# Patient Record
Sex: FEMALE | Race: WHITE | NOT HISPANIC OR LATINO | Employment: FULL TIME | URBAN - METROPOLITAN AREA
[De-identification: names, ages, dates, MRNs, and addresses within clinical notes are randomized per-mention and may not be internally consistent; named-entity substitution may affect disease eponyms.]

---

## 2020-06-28 ENCOUNTER — OFFICE VISIT (OUTPATIENT)
Dept: URGENT CARE | Facility: CLINIC | Age: 55
End: 2020-06-28
Payer: COMMERCIAL

## 2020-06-28 VITALS
HEART RATE: 64 BPM | RESPIRATION RATE: 16 BRPM | OXYGEN SATURATION: 100 % | BODY MASS INDEX: 19.63 KG/M2 | TEMPERATURE: 98.1 F | WEIGHT: 115 LBS | SYSTOLIC BLOOD PRESSURE: 143 MMHG | HEIGHT: 64 IN | DIASTOLIC BLOOD PRESSURE: 70 MMHG

## 2020-06-28 DIAGNOSIS — Z23 NEED FOR VACCINATION: ICD-10-CM

## 2020-06-28 DIAGNOSIS — T63.441A LOCAL REACTION TO BEE STING, ACCIDENTAL OR UNINTENTIONAL, INITIAL ENCOUNTER: Primary | ICD-10-CM

## 2020-06-28 PROCEDURE — 90471 IMMUNIZATION ADMIN: CPT | Performed by: EMERGENCY MEDICINE

## 2020-06-28 PROCEDURE — 90715 TDAP VACCINE 7 YRS/> IM: CPT

## 2020-06-28 PROCEDURE — 99203 OFFICE O/P NEW LOW 30 MIN: CPT | Performed by: EMERGENCY MEDICINE

## 2020-06-28 RX ORDER — PREDNISONE 10 MG/1
TABLET ORAL
Qty: 24 TABLET | Refills: 0 | Status: SHIPPED | OUTPATIENT
Start: 2020-06-28

## 2020-06-28 RX ORDER — EPINEPHRINE 0.3 MG/.3ML
0.3 INJECTION SUBCUTANEOUS ONCE
Qty: 0.6 ML | Refills: 0 | Status: SHIPPED | OUTPATIENT
Start: 2020-06-28 | End: 2020-06-28

## 2022-09-19 ENCOUNTER — OFFICE VISIT (OUTPATIENT)
Dept: OBGYN CLINIC | Facility: CLINIC | Age: 57
End: 2022-09-19
Payer: COMMERCIAL

## 2022-09-19 VITALS
SYSTOLIC BLOOD PRESSURE: 118 MMHG | HEIGHT: 63 IN | BODY MASS INDEX: 21.16 KG/M2 | TEMPERATURE: 97.6 F | WEIGHT: 119.4 LBS | DIASTOLIC BLOOD PRESSURE: 70 MMHG | HEART RATE: 83 BPM

## 2022-09-19 DIAGNOSIS — M25.532 LEFT WRIST PAIN: Primary | ICD-10-CM

## 2022-09-19 DIAGNOSIS — M18.12 ARTHRITIS OF CARPOMETACARPAL (CMC) JOINT OF LEFT THUMB: ICD-10-CM

## 2022-09-19 PROCEDURE — 99204 OFFICE O/P NEW MOD 45 MIN: CPT | Performed by: ORTHOPAEDIC SURGERY

## 2022-09-19 RX ORDER — NAPROXEN 500 MG/1
500 TABLET ORAL 2 TIMES DAILY WITH MEALS
Qty: 60 TABLET | Refills: 1 | Status: SHIPPED | OUTPATIENT
Start: 2022-09-19

## 2022-09-19 NOTE — PROGRESS NOTES
ASSESSMENT/PLAN:    Problem List Items Addressed This Visit        Other    Left wrist pain - Primary      Other Visit Diagnoses     Arthritis of carpometacarpal (CMC) joint of left thumb        Relevant Medications    naproxen (NAPROSYN) 500 mg tablet    Other Relevant Orders    Cock Up Wrist Splint        X-rays reviewed and discussed with the patient, which does not show any evidence of acute fracture, though is positive for [facility] joint degenerative changes  The patient was offered a [facility] joint CSI, though she declined  The patient also declined offer to begin formal PT  She was fitted with a thumb spica brace today, which she was instructed to wear full-time, removed only for cleansing  She was provided with a script for Naproxen  The patient will return to the office in 2 weeks for recheck  She was instructed to call or return to the office sooner if any problems arise  Return in about 2 weeks (around 10/3/2022)  _____________________________________________________  CHIEF COMPLAINT:  Chief Complaint   Patient presents with    Left Wrist - Pain         SUBJECTIVE:  Michelle Hartman is a right hand dominant 56 y o  female who presents to the office today for initial evaluation of a left wrist injury  The patient states that on 9/4/2022 she was out in the woods, clearing the trails for hunting season when she tripped over a fallen log, landing onto her knees and her outstretched left hand  The patient states that she felt immediate pain along her left wrist, as well as her right knee, but was able to get herself up and ambulate  The patient states that the pain in her left wrist continued, and she presented to an urgent care on 9/15/2022 for evaluation  X-rays of the wrist were taken and the patient was placed into a short arm splint due to concerns for a possible hairline fracture, per the patient   Today the patient states that she continues to experience pain along the base of the thumb and the radial aspect of the dorsal wrist  The patient denies any history of prior fracture or injury to the hand or wrist, but does admit to experiencing episodes of clicking and pain along the thumb/radial aspect of the wrist prior to this injury  She also admits to occasionally dropping things with the left hand  She denies any numbness or tingling  The patient has been taking Tylenol for pain relief  PAST MEDICAL HISTORY:  Past Medical History:   Diagnosis Date    Allergic        PAST SURGICAL HISTORY:  Past Surgical History:   Procedure Laterality Date    BUNIONECTOMY      CHOLECYSTECTOMY      PARTIAL HYSTERECTOMY      SHOULDER SURGERY      TUBAL LIGATION         FAMILY HISTORY:  History reviewed  No pertinent family history  SOCIAL HISTORY:  Social History     Tobacco Use    Smoking status: Never Smoker    Smokeless tobacco: Never Used   Vaping Use    Vaping Use: Never used   Substance Use Topics    Alcohol use: Yes    Drug use: Never       MEDICATIONS:    Current Outpatient Medications:     EPINEPHrine (EPIPEN) 0 3 mg/0 3 mL SOAJ, Inject 0 3 mL (0 3 mg total) into a muscle once for 1 dose, Disp: 0 6 mL, Rfl: 0    naproxen (NAPROSYN) 500 mg tablet, Take 1 tablet (500 mg total) by mouth 2 (two) times a day with meals, Disp: 60 tablet, Rfl: 1    predniSONE 10 mg tablet, Take once daily all days pills on this schedule 5- 5- 4- 4- 3- 2- 1 (Patient not taking: Reported on 9/19/2022), Disp: 24 tablet, Rfl: 0    ALLERGIES:  Allergies   Allergen Reactions    Bee Venom Edema    Penicillins Rash       Review of systems:   Constitutional: Negative for fatigue, fever or loss of apetite  HENT: Negative  Respiratory: Negative for shortness of breath, dyspnea  Cardiovascular: Negative for chest pain/tightness  Gastrointestinal: Negative for abdominal pain, N/V  Endocrine: Negative for cold/heat intolerance, unexplained weight loss/gain  Genitourinary: Negative for flank pain, dysuria, hematuria  Musculoskeletal: acute left wrist pain as noted in HPI   Skin: Negative for rash  Neurological: negative for numbness or tingling  Psychiatric/Behavioral: Negative for agitation  _____________________________________________________  PHYSICAL EXAMINATION:    Blood pressure 118/70, pulse 83, temperature 97 6 °F (36 4 °C), temperature source Temporal, height 5' 3" (1 6 m), weight 54 2 kg (119 lb 6 4 oz)  General: well developed and well nourished, alert, oriented times 3 and appears comfortable  Psychiatric: Normal  HEENT: Benign  Cardiovascular: Regular    Pulmonary: No wheezing or stridor  Abdomen: Soft, Nontender  Skin: No masses, erythema, lacerations, fluctation, ulcerations  Neurovascular: strong distal pulses, sensory and motor testing intact    MUSCULOSKELETAL EXAMINATION:    Left hand/wrist:  - short arm splint removed today in office without complication  Skin without lesions, ecchymosis, erythema, swelling, warmth, or other signs of infection  - there is no visible or palpable deformity   - the patient complains of palpable tenderness along the proximal first metacarpal, CMC joint  There is also snuffbox tenderness, though the patient notes that the pain is more severe along the [facility] joint  No tenderness along the distal radius or ulna  - Full active flexion/extension of the thumb MCP, IP joints  The patient does note pain along the thumb and wrist with thumb hyperextension  No pain with thumb abduction or adduction    - Full active ROM of all other digits without complaint   -  Full active ROM of the wrist without complaint, including radial and ulnar deviation  - patient is able to make a composite fist, 5/5  strength  - 5/5 strength resisted thumb flexion and extension  - there is noted snapping of the EPL tendon with ROM testing, though this is also present on the contralateral side     - negative CMC grind  - negative Finkelstein's  - mild pain during resisted thumb abduction at the [facility] joint and 1st dorsal compartment with no complaints during resisted thumb extension  - No pain or laxity with varus or valgus stress of the thumb MCP joint at 0 degrees and 30 degrees of flexion  - sensation and motor intact along the radial, median, and ulnar nerve distribution   - strong radial pulse  - brisk cap refill in all fingers      _____________________________________________________  STUDIES REVIEWED:  I have personally reviewed pertinent films and reports in PACS  X-rays of the left wrist and hand taken on 9/15/2022 demonstrates no evidence of acute fracture or dislocation  There are degenerative changes along the [address] joint  No bony lesions       Gladys Sabillon

## 2022-10-03 ENCOUNTER — OFFICE VISIT (OUTPATIENT)
Dept: OBGYN CLINIC | Facility: CLINIC | Age: 57
End: 2022-10-03
Payer: COMMERCIAL

## 2022-10-03 VITALS
TEMPERATURE: 97.6 F | DIASTOLIC BLOOD PRESSURE: 80 MMHG | SYSTOLIC BLOOD PRESSURE: 120 MMHG | BODY MASS INDEX: 21.09 KG/M2 | WEIGHT: 119 LBS | HEART RATE: 72 BPM | HEIGHT: 63 IN

## 2022-10-03 DIAGNOSIS — M18.12 ARTHRITIS OF CARPOMETACARPAL (CMC) JOINT OF LEFT THUMB: Primary | ICD-10-CM

## 2022-10-03 PROCEDURE — 99213 OFFICE O/P EST LOW 20 MIN: CPT | Performed by: ORTHOPAEDIC SURGERY

## 2022-10-03 PROCEDURE — 20600 DRAIN/INJ JOINT/BURSA W/O US: CPT | Performed by: ORTHOPAEDIC SURGERY

## 2022-10-03 RX ORDER — TRIAMCINOLONE ACETONIDE 40 MG/ML
20 INJECTION, SUSPENSION INTRA-ARTICULAR; INTRAMUSCULAR
Status: COMPLETED | OUTPATIENT
Start: 2022-10-03 | End: 2022-10-03

## 2022-10-03 RX ORDER — BUPIVACAINE HYDROCHLORIDE 2.5 MG/ML
1 INJECTION, SOLUTION INFILTRATION; PERINEURAL
Status: COMPLETED | OUTPATIENT
Start: 2022-10-03 | End: 2022-10-03

## 2022-10-03 RX ADMIN — TRIAMCINOLONE ACETONIDE 20 MG: 40 INJECTION, SUSPENSION INTRA-ARTICULAR; INTRAMUSCULAR at 08:56

## 2022-10-03 RX ADMIN — BUPIVACAINE HYDROCHLORIDE 1 ML: 2.5 INJECTION, SOLUTION INFILTRATION; PERINEURAL at 08:56

## 2022-10-03 NOTE — PROGRESS NOTES
ASSESSMENT/PLAN:    Diagnoses and all orders for this visit:    Arthritis of carpometacarpal [place]) joint of left thumb        Plan:  Treatment options were discussed  I offered her referral to Hand surgery for discussion of surgical intervention  However, she does not want to pursue surgery and elected to proceed with injection  This was performed without difficulty and tolerated well  I will see her in 2-3 weeks for re-evaluation  She is to contact me if questions or concerns arise  If she does not see improvement in symptoms over the next 7-10 days, she can contact the office and I will place a referral for hand surgery evaluation  She is to use her thumb spica brace when more active, such as when at work  At this time, she will avoid anti-inflammatories since the Naprosyn upset her stomach and will see if the injection alone will provide adequate relief  She is permitted to use Tylenol as needed  _____________________________________________________  CHIEF COMPLAINT:  Chief Complaint   Patient presents with    Follow-up         SUBJECTIVE:  Bony Watters is a 56y o  year old female who presents for follow up of left thumb CMC joint arthritis  She was last in regards to this issue on 9/19/2022, at which time she declined corticosteroid injection and physical therapy, and opted for immobilization via thumb spica splint and oral anti-inflammatories  On today's presentation, she states that she has been consistent with use of the thumb spica splint as instructed  She also admits that she use naproxen for approximately 1 week, but then discontinued because of the resulting GI symptoms  She states that she feels the splint has helped somewhat for, but when she takes it off she feels that she is more stiff and sore and painful  She denies any new onset bruising, swelling, numbness, tingling, or mechanical symptoms         PAST MEDICAL HISTORY:  Past Medical History:   Diagnosis Date    Allergic PAST SURGICAL HISTORY:  Past Surgical History:   Procedure Laterality Date    BUNIONECTOMY      CHOLECYSTECTOMY      PARTIAL HYSTERECTOMY      SHOULDER SURGERY      TUBAL LIGATION         FAMILY HISTORY:  History reviewed  No pertinent family history  SOCIAL HISTORY:  Social History     Tobacco Use    Smoking status: Never Smoker    Smokeless tobacco: Never Used   Vaping Use    Vaping Use: Never used   Substance Use Topics    Alcohol use: Yes    Drug use: Never       MEDICATIONS:    Current Outpatient Medications:     EPINEPHrine (EPIPEN) 0 3 mg/0 3 mL SOAJ, Inject 0 3 mL (0 3 mg total) into a muscle once for 1 dose, Disp: 0 6 mL, Rfl: 0    naproxen (NAPROSYN) 500 mg tablet, Take 1 tablet (500 mg total) by mouth 2 (two) times a day with meals, Disp: 60 tablet, Rfl: 1    predniSONE 10 mg tablet, Take once daily all days pills on this schedule 5- 5- 4- 4- 3- 2- 1 (Patient not taking: No sig reported), Disp: 24 tablet, Rfl: 0    ALLERGIES:  Allergies   Allergen Reactions    Bee Venom Edema    Penicillins Rash       REVIEW OF SYSTEMS:  Pertinent items are noted in HPI    A comprehensive review of systems was negative       _____________________________________________________  PHYSICAL EXAMINATION:  General: well developed and well nourished, alert, oriented times 3 and appears comfortable  Psychiatric: Normal  HEENT:  Benign  Cardiovascular:  Normal rate  Pulmonary: No wheezing or stridor  Skin: No masses, erythema, lacerations, fluctation, ulcerations  Neurovascular:  Palpable distal pulses, DTRs intact    MUSCULOSKELETAL EXAMINATION:  Left hand/thumb -   Patient presents with no obvious anatomical deformity  Skin is warm and dry to touch with no signs of erythema, ecchymosis, infection  No soft tissue swelling or effusion noted   Full FDS, FDP, extensor mechanisms are intact   EPB, APB, and FPL functions intact  Subluxing extensor tendon observed on exam, nonpainful  No rotational deformity with composite finger flexion  TTP over 1st CMC joint  + CMC load and shift, + CMC grind  - MCP hyperextension deformity  Demonstrates normal wrist, elbow, and shoulder motion  Forearm compartments are soft and supple  2+ distal radial pulse with brisk capillary refill to the fingers  Radial, median, and ulnar motor and sensory distributions intact  Sensation light touch intact distally    _____________________________________________________  STUDIES REVIEWED:  X-rays were again reviewed  PROCEDURES PERFORMED:  Small joint arthrocentesis: L thumb CMC  [place] Protocol:  Consent: Verbal consent obtained    Consent given by: patient  Patient understanding: patient states understanding of the procedure being performed    Supporting Documentation  Indications: pain   Procedure Details  Location: thumb - L thumb CMC  Needle size: 25 G  Ultrasound guidance: no  Approach: radial  Medications administered: 1 mL bupivacaine 0 25 %; 20 mg triamcinolone acetonide 40 mg/mL            Scribe Attestation    I,:  Caryn Bennett am acting as a scribe while in the presence of the attending physician :       I,:  Lyudmila Jacques personally performed the services described in this documentation    as scribed in my presence :

## 2022-10-16 ENCOUNTER — TELEPHONE (OUTPATIENT)
Dept: OTHER | Facility: OTHER | Age: 57
End: 2022-10-16

## 2022-10-16 NOTE — TELEPHONE ENCOUNTER
Patient is calling regarding cancelling an appointment  Date/Time: 10- @ 8:15 am    Patient was rescheduled: YES [] NO [x]    Patient requesting call back to reschedule: YES [] NO [x]    Patient's Left Hand is better after Cortisone Injection

## 2025-08-18 ENCOUNTER — TELEPHONE (OUTPATIENT)
Age: 60
End: 2025-08-18